# Patient Record
Sex: MALE | Race: WHITE | Employment: FULL TIME | URBAN - METROPOLITAN AREA
[De-identification: names, ages, dates, MRNs, and addresses within clinical notes are randomized per-mention and may not be internally consistent; named-entity substitution may affect disease eponyms.]

---

## 2019-03-27 ENCOUNTER — HOSPITAL ENCOUNTER (EMERGENCY)
Facility: CLINIC | Age: 19
Discharge: HOME | End: 2019-03-28
Attending: PHYSICIAN ASSISTANT | Admitting: PHYSICIAN ASSISTANT
Payer: COMMERCIAL

## 2019-03-27 VITALS
OXYGEN SATURATION: 98 % | TEMPERATURE: 99.7 F | WEIGHT: 230 LBS | DIASTOLIC BLOOD PRESSURE: 90 MMHG | HEART RATE: 106 BPM | RESPIRATION RATE: 16 BRPM | SYSTOLIC BLOOD PRESSURE: 133 MMHG

## 2019-03-27 DIAGNOSIS — J35.8 TONSILLAR EXUDATE: ICD-10-CM

## 2019-03-27 DIAGNOSIS — J02.9 ACUTE PHARYNGITIS, UNSPECIFIED ETIOLOGY: ICD-10-CM

## 2019-03-27 LAB
DEPRECATED S PYO AG THROAT QL EIA: NORMAL
SPECIMEN SOURCE: NORMAL

## 2019-03-27 PROCEDURE — 25000128 H RX IP 250 OP 636: Performed by: PHYSICIAN ASSISTANT

## 2019-03-27 PROCEDURE — 99284 EMERGENCY DEPT VISIT MOD MDM: CPT | Mod: 25

## 2019-03-27 PROCEDURE — 96374 THER/PROPH/DIAG INJ IV PUSH: CPT

## 2019-03-27 PROCEDURE — 87880 STREP A ASSAY W/OPTIC: CPT | Performed by: EMERGENCY MEDICINE

## 2019-03-27 PROCEDURE — 87081 CULTURE SCREEN ONLY: CPT | Performed by: PHYSICIAN ASSISTANT

## 2019-03-27 PROCEDURE — 25000132 ZZH RX MED GY IP 250 OP 250 PS 637: Performed by: PHYSICIAN ASSISTANT

## 2019-03-27 RX ORDER — ACETAMINOPHEN 500 MG
1000 TABLET ORAL ONCE
Status: COMPLETED | OUTPATIENT
Start: 2019-03-27 | End: 2019-03-27

## 2019-03-27 RX ORDER — DEXAMETHASONE SODIUM PHOSPHATE 10 MG/ML
10 INJECTION, SOLUTION INTRAMUSCULAR; INTRAVENOUS ONCE
Status: COMPLETED | OUTPATIENT
Start: 2019-03-27 | End: 2019-03-27

## 2019-03-27 RX ADMIN — DEXAMETHASONE SODIUM PHOSPHATE 10 MG: 10 INJECTION, SOLUTION INTRAMUSCULAR; INTRAVENOUS at 23:34

## 2019-03-27 RX ADMIN — ACETAMINOPHEN 1000 MG: 500 TABLET, FILM COATED ORAL at 23:34

## 2019-03-27 ASSESSMENT — ENCOUNTER SYMPTOMS
HEADACHES: 1
FEVER: 1
CHILLS: 1
ABDOMINAL PAIN: 0
SORE THROAT: 1
TROUBLE SWALLOWING: 1

## 2019-03-27 NOTE — ED AVS SNAPSHOT
[facility] Emergency Department  [address]  [facility] [address]  Phone:  [phone]  Fax:  [phone]                                    Joshua Maurer   MRN: [id number]    Department:  [facility] Emergency Department   Date of Visit:  3/27/2019           After Visit Summary Signature Page    I have received my discharge instructions, and my questions have been answered. I have discussed any challenges I see with this plan with the nurse or doctor.    ..........................................................................................................................................  Patient/Patient Representative Signature      ..........................................................................................................................................  Patient Representative Print Name and Relationship to Patient    ..................................................               ................................................  Date                                   Time    ..........................................................................................................................................  Reviewed by Signature/Title    ...................................................              ..............................................  Date                                               Time          22EPIC Rev 08/18

## 2019-03-28 NOTE — ED PROVIDER NOTES
History     Chief Complaint:  Sore Throat    HPI   Joshua Maurer is a 18 year old male who presents to the emergency department for evaluation of a sore throat. The patient reports he has experienced around 2 days of sore throat and swollen tonsils, as well as difficulty swallowing secondary to the pain, headache, subjective fever, and chills. He denies any known sick contacts. He remarks his right tonsil is larger than his left. The patient took ibuprofen prior to arrival. He denies any abdominal pain or recorded fevers. He did not receive the influenza vaccination this year. He and his girlfriend had the flu several weeks ago. He denies any history of mono.    Allergies:  NKDA     Medications:    The patient is currently on no regular medications.     Past Medical History:    Sleep apnea    Past Surgical History:    The patient does not have any pertinent past surgical history.    Family History:    No past pertinent family history.    Social History:  Presents with significant other.  Marital Status:  Single [1]     Review of Systems   Constitutional: Positive for chills and fever (subjective).   HENT: Positive for sore throat and trouble swallowing.    Gastrointestinal: Negative for abdominal pain.   Neurological: Positive for headaches.   All other systems reviewed and are negative.    Physical Exam     Patient Vitals for the past 24 hrs:   BP Temp Temp src Pulse Resp SpO2 Weight   03/27/19 2312 133/90 -- -- -- -- -- --   03/27/19 2311 -- 99.7  F (37.6  C) Temporal 106 16 98 % 104.3 kg (230 lb)     Physical Exam  General: Alert and interactive. Appears well. Cooperative and pleasant.   Eyes: The pupils are equal and round. EOMs intact. No scleral icterus.  ENT: No abnormalities to the external nose or ears. Mucous membranes moist. Posterior oropharynx is erythematous. 2+ right tonsil, 1+ left tonsil. Exudates bilaterally. Uvula rises with phonation and there is no uvular deviation. There are no signs of  peritonsillar abscess.  Neck: Trachea is in the midline. No nuchal rigidity.     CV: Regular rate and rhythm. S1 and S2 normal without murmur, click, gallop or rub.   Resp: Breath sounds are clear bilaterally, without rhonchi, wheezes, rales. Non-labored, no retractions or accessory muscle use.  GI: Abdomen is soft without distension. No tenderness to palpation. No peritoneal signs.    MS: Moving all extremities well. Good muscle tone.   Skin: Warm and dry. No rash or lesions noted.  Neuro: Alert and oriented x 3. No focal neurologic deficits. Good strength and sensation in upper and lower extremities.    Psych: Awake. Alert.  Normal affect. Appropriate interactions.  Lymph: No anterior or posterior cervical lymphadenopathy noted.    Emergency Department Course     Laboratory:  Rapid strep screen: Negative    Beta strep group A culture pending.    Interventions:  2334 Decadron 10 mg IV   Tylenol 1000 mg PO    Emergency Department Course:  Nursing notes and vitals reviewed. 2326 I performed an exam of the patient as documented above.     The patient was swabbed. This was sent to the lab for further testing, results above.    2354 I rechecked the patient and discussed the results of his workup thus far.     Findings and plan explained to the Patient. Patient discharged home with instructions regarding supportive care, medications, and reasons to return. The importance of close follow-up was reviewed. The patient was prescribed Augmentin.    I personally reviewed the laboratory results with the Patient and answered all related questions prior to discharge.    Impression & Plan      Medical Decision Making:  Joshua Maurer is a 18 year old male presented for evaluation of sore throat. Rapid strep was negative. No clinical evidence of peritonsillar abscess or deep space infection, but given patient's vocalization of difficult breathing and the appearance of his tonsils will treat him with Decadron orally, alternating  doses ofTylenol and Ibuprofen, and 10 days of Augmentin to treat cellulitis, as I am concerned this will develop into an abscess. Airway is patent, and patient is 98% on RA. Follow up with PCP in three days if no improvement and immediately here if worsening. Advised for immediate return to ED if they develop high fevers not controlled with medications, difficulty swallowing own saliva, inability to open their jaw, or any other worrisome concerns.     Diagnosis:    ICD-10-CM   1. Acute pharyngitis, unspecified etiology J02.9   2. Tonsillar exudate J35.8       Disposition:  discharged to home    Discharge Medications:     Medication List      Started    amoxicillin-clavulanate 875-125 MG tablet  Commonly known as:  AUGMENTIN  1 tablet, Oral, 2 TIMES DAILY          I, Reinier Arango, am serving as a scribe on 3/27/2019 at 11:18 PM to personally document services performed by Gretchen Swan, * based on my observations and the provider's statements to me.     Reinier Arango  3/27/2019   [facility] EMERGENCY DEPARTMENT       Gretchen Swan PA-C  03/28/19 0235

## 2019-03-28 NOTE — ED TRIAGE NOTES
Pt in with C/O pharyngitis. Pt reports painful and swollen tonsils. Swelling present on exam with white patches of exudate noted. ABCD's intact. Pt A&Ox4

## 2019-03-30 LAB
BACTERIA SPEC CULT: NORMAL
SPECIMEN SOURCE: NORMAL